# Patient Record
Sex: FEMALE | Race: BLACK OR AFRICAN AMERICAN | NOT HISPANIC OR LATINO | URBAN - METROPOLITAN AREA
[De-identification: names, ages, dates, MRNs, and addresses within clinical notes are randomized per-mention and may not be internally consistent; named-entity substitution may affect disease eponyms.]

---

## 2024-11-22 PROBLEM — 398311004: Status: ACTIVE | Noted: 2024-11-22

## 2024-11-22 PROBLEM — 428283002: Status: ACTIVE | Noted: 2024-11-22

## 2024-11-25 ENCOUNTER — OFFICE VISIT (OUTPATIENT)
Dept: URBAN - METROPOLITAN AREA CLINIC 72 | Facility: CLINIC | Age: 64
End: 2024-11-25

## 2024-11-25 PROBLEM — 736687002: Status: ACTIVE | Noted: 2024-11-25

## 2024-11-25 PROBLEM — 863927004: Status: ACTIVE | Noted: 2024-11-25

## 2024-11-25 RX ORDER — GABAPENTIN 300 MG/1
1 CAPSULE CAPSULE ORAL THREE TIMES A DAY
Status: ACTIVE | COMMUNITY

## 2024-11-25 RX ORDER — HYDROCHLOROTHIAZIDE 25 MG/1
1 TABLET IN THE MORNING TABLET ORAL ONCE A DAY
Status: ACTIVE | COMMUNITY

## 2024-11-25 NOTE — HPI-TODAY'S VISIT:
Patient is a 64-year-old female last seen for colonoscopy on 12/8/2022.  Unclear why patient is here today.

## 2024-11-25 NOTE — HPI-OTHER HISTORIES
Procedures: Colonoscopy-12/8/2022: Mild diverticulosis throughout the colon. Descending colon polyp x 2. Sigmoid colon polyp x 1. Path: Descending colon polyps were hyperplastic. Sigmoid colon polyp was a tubulovillous adenoma. Recall 3 years. Due 12/2025.

## 2024-11-26 ENCOUNTER — OFFICE VISIT (OUTPATIENT)
Dept: URBAN - METROPOLITAN AREA CLINIC 72 | Facility: CLINIC | Age: 64
End: 2024-11-26
Payer: MEDICARE

## 2024-11-26 VITALS
BODY MASS INDEX: 27.31 KG/M2 | WEIGHT: 174 LBS | TEMPERATURE: 97.2 F | HEART RATE: 74 BPM | SYSTOLIC BLOOD PRESSURE: 132 MMHG | HEIGHT: 67 IN | DIASTOLIC BLOOD PRESSURE: 70 MMHG

## 2024-11-26 DIAGNOSIS — Z86.0101 PERSONAL HISTORY OF ADENOMATOUS AND SERRATED COLON POLYPS: ICD-10-CM

## 2024-11-26 DIAGNOSIS — R76.8 HEPATITIS B CORE ANTIBODY POSITIVE: ICD-10-CM

## 2024-11-26 DIAGNOSIS — R79.89 ELEVATED LFTS: ICD-10-CM

## 2024-11-26 PROCEDURE — 99204 OFFICE O/P NEW MOD 45 MIN: CPT

## 2024-11-26 RX ORDER — GABAPENTIN 300 MG/1
1 CAPSULE CAPSULE ORAL THREE TIMES A DAY
Status: ACTIVE | COMMUNITY

## 2024-11-26 RX ORDER — ATORVASTATIN CALCIUM 20 MG/1
1 TABLET TABLET, FILM COATED ORAL ONCE A DAY
Status: ACTIVE | COMMUNITY

## 2024-11-26 RX ORDER — HYDROCHLOROTHIAZIDE 25 MG/1
1 TABLET IN THE MORNING TABLET ORAL ONCE A DAY
Status: ACTIVE | COMMUNITY

## 2024-11-26 RX ORDER — OXYCODONE HYDROCHLORIDE 30 MG/1
AS DIRECTED TABLET ORAL
Status: ACTIVE | COMMUNITY

## 2024-11-26 NOTE — HPI-OTHER HISTORIES
Procedures: Colonoscopy-12/8/2022: Mild diverticulosis throughout the colon. Descending colon polyp x 2. Sigmoid colon polyp x 1. Path: Descending colon polyps were hyperplastic. Sigmoid colon polyp was a tubulovillous adenoma. Recall 3 years. Due 12/2025.  Labs: 11/22/2024-WBC 5.27, RBC 4.1, hemoglobin 12.8, hematocrit 39.2, MCV 96.1, , K4.2, BUN 15, creatinine 0.7, AST 83, , ALP 49, total bili 0.8, ferritin 851.3  7/17/2024-hepatitis A IgM antibody nonreactive, hep B antigen nonreactive, hep B core IgM antibody reactive, hep C antibody nonreactive  DI: 7/24/2024 abdominal ultrasound: Gallbladder is well-distended with no stones, sludge, or gallbladder wall thickening. There was no sonographic Méndez sign. Common bile duct within normal limits, measuring 7 mm. Liver is homogeneous in echotexture with no mass or ascites. Right lobe measures 14 cm long axis. Negative study.

## 2024-11-26 NOTE — HPI-TODAY'S VISIT:
Patient is a 64-year-old female last seen for colonoscopy on 12/8/2022. Patient recently referred bact to our pactice by Dr. Prieto Alva for elevated LFTs with positive hep B core antibody 4 months ago.  Per referral, ultrasound of liver was normal.  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 Never had a blood transfusion. She drinks 1-2 glassess of white zinfandel daily. Recently put on Atorvastatin ( a few months ago).   Patient has never had an elevation of liver enzymes before.

## 2025-01-03 ENCOUNTER — OFFICE VISIT (OUTPATIENT)
Dept: URBAN - METROPOLITAN AREA CLINIC 72 | Facility: CLINIC | Age: 65
End: 2025-01-03

## 2025-01-09 ENCOUNTER — TELEPHONE ENCOUNTER (OUTPATIENT)
Dept: URBAN - METROPOLITAN AREA CLINIC 72 | Facility: CLINIC | Age: 65
End: 2025-01-09

## 2025-01-28 ENCOUNTER — OFFICE VISIT (OUTPATIENT)
Dept: URBAN - METROPOLITAN AREA CLINIC 72 | Facility: CLINIC | Age: 65
End: 2025-01-28
Payer: MEDICARE

## 2025-01-28 VITALS
HEART RATE: 88 BPM | HEIGHT: 67 IN | TEMPERATURE: 97.8 F | DIASTOLIC BLOOD PRESSURE: 72 MMHG | WEIGHT: 171 LBS | BODY MASS INDEX: 26.84 KG/M2 | SYSTOLIC BLOOD PRESSURE: 122 MMHG

## 2025-01-28 DIAGNOSIS — R79.89 ELEVATED LFTS: ICD-10-CM

## 2025-01-28 DIAGNOSIS — R76.8 HEPATITIS B CORE ANTIBODY POSITIVE: ICD-10-CM

## 2025-01-28 DIAGNOSIS — K57.30 PANCOLONIC DIVERTICULOSIS: ICD-10-CM

## 2025-01-28 DIAGNOSIS — Z86.0101 PERSONAL HISTORY OF ADENOMATOUS AND SERRATED COLON POLYPS: ICD-10-CM

## 2025-01-28 PROCEDURE — 99214 OFFICE O/P EST MOD 30 MIN: CPT | Performed by: INTERNAL MEDICINE

## 2025-01-28 RX ORDER — GABAPENTIN 300 MG/1
1 CAPSULE CAPSULE ORAL THREE TIMES A DAY
Status: ACTIVE | COMMUNITY

## 2025-01-28 RX ORDER — OXYCODONE HYDROCHLORIDE 30 MG/1
AS DIRECTED TABLET ORAL
Status: ACTIVE | COMMUNITY

## 2025-01-28 RX ORDER — ATORVASTATIN CALCIUM 20 MG/1
1 TABLET TABLET, FILM COATED ORAL ONCE A DAY
Status: ACTIVE | COMMUNITY

## 2025-01-28 RX ORDER — HYDROCHLOROTHIAZIDE 25 MG/1
1 TABLET IN THE MORNING TABLET ORAL ONCE A DAY
Status: ACTIVE | COMMUNITY

## 2025-01-28 NOTE — HPI-TODAY'S VISIT:
Mrs. Singer is a pleasant 64-year-old who returns for follow-up, last seen in office on 2024.  She has a history of colon polyps and most recently elevated liver associated enzymes with positive hep B core antibody.  Ultrasound done by PCP reportedly normal.  Patient reports that she was vaccinated for hep A or B because of her previous job, denied any prior blood transfusions did consume alcohol and was recently started on atorvastatin.  She reported no history of elevated liver enzymes prior to this evaluation.  Based on this and data available hep B DNA, surface antibody, core antibody as well as hepatic function panel were ordered.  We advised stopping statin temporarily and stopping alcohol.  1/10/2025 AST 25, ALT 28, alk phos 40, total bilirubin 0.9, hepatitis B DNA not detected, core IgM negative, surface antibody reactive, core antibody negative  Lab work: 2024 hepatitis A IgM nonreactive, hepatitis B antigen nonreactive, hepatitis B core IgM antibody reactive, hepatitis C antibody nonreactive 2024 WBC 5.2, hematoma 0.8, medic at 39.2, MCV 96.1, sodium 143, creatinine 0.7, AST 83, , alk phos 49, bilirubin 0.8, ferritin 851  [id number] ultrasound gallbladder normal normal bile ducts upper limit of normal at 7 mm homogenous echotexture of the liver without mass or ascites overall normal study  Colonoscopy: 2022 diverticulosis, 2 descending and 1 sigmoid polyp, descending colon polyp hyperplastic, sigmoid polyp tubulovillous adenoma, due for repeat

## 2025-01-28 NOTE — EXAM-PHYSICAL EXAM
General--no acute distress, resting comfortably Eyes--anicteric, no pallor HENT--normocephalic, atraumatic head Neck--no lymphadenopathy, symmetric Chest--non labored breathing, equal rise Abdomen--soft, non tender, non distended, no organomegaly Ext: FOX, no obvious sores or rashes